# Patient Record
Sex: FEMALE | Race: WHITE
[De-identification: names, ages, dates, MRNs, and addresses within clinical notes are randomized per-mention and may not be internally consistent; named-entity substitution may affect disease eponyms.]

---

## 2021-05-18 ENCOUNTER — HOSPITAL ENCOUNTER (EMERGENCY)
Dept: HOSPITAL 63 - ER | Age: 56
Discharge: HOME | End: 2021-05-18
Payer: COMMERCIAL

## 2021-05-18 VITALS — WEIGHT: 149.91 LBS | HEIGHT: 71 IN | BODY MASS INDEX: 20.99 KG/M2

## 2021-05-18 VITALS — SYSTOLIC BLOOD PRESSURE: 149 MMHG

## 2021-05-18 DIAGNOSIS — Z88.5: ICD-10-CM
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
## 2021-05-18 DIAGNOSIS — Z85.71: ICD-10-CM

## 2021-05-18 DIAGNOSIS — R94.8: ICD-10-CM

## 2021-05-18 DIAGNOSIS — R10.11: Primary | ICD-10-CM

## 2021-05-18 DIAGNOSIS — Z91.040: ICD-10-CM

## 2021-05-18 DIAGNOSIS — F17.210: ICD-10-CM

## 2021-05-18 DIAGNOSIS — T50.Z95A: ICD-10-CM

## 2021-05-18 LAB
ALBUMIN SERPL-MCNC: 2.6 G/DL (ref 3.4–5)
ALBUMIN/GLOB SERPL: 0.5 {RATIO} (ref 1–1.7)
ALP SERPL-CCNC: 92 U/L (ref 46–116)
ALT SERPL-CCNC: 25 U/L (ref 14–59)
ANION GAP SERPL CALC-SCNC: 5 MMOL/L (ref 6–14)
AST SERPL-CCNC: 22 U/L (ref 15–37)
BASOPHILS # BLD AUTO: 0 X10^3/UL (ref 0–0.2)
BASOPHILS NFR BLD: 0 % (ref 0–3)
BILIRUB SERPL-MCNC: 0.4 MG/DL (ref 0.2–1)
BUN/CREAT SERPL: 25 (ref 6–20)
CA-I SERPL ISE-MCNC: 33 MG/DL (ref 7–20)
CALCIUM SERPL-MCNC: 9 MG/DL (ref 8.5–10.1)
CHLORIDE SERPL-SCNC: 103 MMOL/L (ref 98–107)
CO2 SERPL-SCNC: 29 MMOL/L (ref 21–32)
CREAT SERPL-MCNC: 1.3 MG/DL (ref 0.6–1)
EOSINOPHIL NFR BLD: 0.1 X10^3/UL (ref 0–0.7)
EOSINOPHIL NFR BLD: 1 % (ref 0–3)
ERYTHROCYTE [DISTWIDTH] IN BLOOD BY AUTOMATED COUNT: 14.1 % (ref 11.5–14.5)
GFR SERPLBLD BASED ON 1.73 SQ M-ARVRAT: 42.8 ML/MIN
GLOBULIN SER-MCNC: 4.9 G/DL (ref 2.2–3.8)
GLUCOSE SERPL-MCNC: 116 MG/DL (ref 70–99)
HCT VFR BLD CALC: 37.6 % (ref 36–47)
HGB BLD-MCNC: 12.6 G/DL (ref 12–15.5)
LIPASE: 83 U/L (ref 73–393)
LYMPHOCYTES # BLD: 0.8 X10^3/UL (ref 1–4.8)
LYMPHOCYTES NFR BLD AUTO: 9 % (ref 24–48)
MCH RBC QN AUTO: 33 PG (ref 25–35)
MCHC RBC AUTO-ENTMCNC: 33 G/DL (ref 31–37)
MCV RBC AUTO: 98 FL (ref 79–100)
MONO #: 1.2 X10^3/UL (ref 0–1.1)
MONOCYTES NFR BLD: 13 % (ref 0–9)
NEUT #: 7.1 X10^3UL (ref 1.8–7.7)
NEUTROPHILS NFR BLD AUTO: 77 % (ref 31–73)
PLATELET # BLD AUTO: 174 X10^3/UL (ref 140–400)
POTASSIUM SERPL-SCNC: 3.8 MMOL/L (ref 3.5–5.1)
PROT SERPL-MCNC: 7.5 G/DL (ref 6.4–8.2)
RBC # BLD AUTO: 3.83 X10^6/UL (ref 3.5–5.4)
SODIUM SERPL-SCNC: 137 MMOL/L (ref 136–145)
WBC # BLD AUTO: 9.1 X10^3/UL (ref 4–11)

## 2021-05-18 PROCEDURE — 85025 COMPLETE CBC W/AUTO DIFF WBC: CPT

## 2021-05-18 PROCEDURE — 74177 CT ABD & PELVIS W/CONTRAST: CPT

## 2021-05-18 PROCEDURE — 96361 HYDRATE IV INFUSION ADD-ON: CPT

## 2021-05-18 PROCEDURE — 83690 ASSAY OF LIPASE: CPT

## 2021-05-18 PROCEDURE — 36415 COLL VENOUS BLD VENIPUNCTURE: CPT

## 2021-05-18 PROCEDURE — 96374 THER/PROPH/DIAG INJ IV PUSH: CPT

## 2021-05-18 PROCEDURE — 80053 COMPREHEN METABOLIC PANEL: CPT

## 2021-05-18 PROCEDURE — 70491 CT SOFT TISSUE NECK W/DYE: CPT

## 2021-05-18 PROCEDURE — 71260 CT THORAX DX C+: CPT

## 2021-05-18 PROCEDURE — 99285 EMERGENCY DEPT VISIT HI MDM: CPT

## 2021-05-18 NOTE — RAD
EXAM: Neck, chest, abdomen and pelvis CT with intravenous contrast.



HISTORY: Neck pain.



TECHNIQUE: Computed tomographic images of the neck, chest, abdomen and pelvis were obtained following
 the administration of intravenous contrast. Multiplanar reformatting was performed.



*One or more of the following individualized dose reduction techniques were utilized for this examina
tion:  

1. Automated exposure control.  

2. Adjustment of the mA and/or kV according to patient size.  

3. Use of iterative reconstruction technique.



COMPARISON: None.



FINDINGS: 



Neck: There is a centrally necrotic mass within the left anterior lateral neck lateral to the common 
carotid artery measuring 4.0 cm in maximum dimension. There are a few nonspecific stranding cervical 
chain lymph nodes. The right submandibular gland is absent or nearly completely absent. There is calc
ified atherosclerotic plaque involving the left carotid bulb, bilateral internal carotid arteries and
 right external carotid artery. This results in less than 50 percent stenosis. No thyroid nodule is s
een. There is a right chest wall port catheter with the tip in the right atrium. There are metallic c
lips are fiducials within the left thoracic inlet. There is a left apical mass or masslike consolidat
ion with small amount of loculated pleural fluid measuring approximately 5.6 cm. This demonstrates sp
iculated margins and surrounding groundglass and internal cystic or bronchiectatic changes. There are
 degenerative changes involving the cervical spine. This is associated with mild left foraminal steno
sis at C3-C4 and mild right and moderate left foraminal stenosis at C5-C6.



Chest: There is left hemithorax volume loss. There is aortic and aortic branch vessel atherosclerosis
. The heart is normal in size. There are enlarged mediastinal lymph nodes. For reference purposes, th
ere are precarinal lymph nodes measuring up to 1.5 cm. There is no pneumothorax. There is a 5.6 cm ma
ss or masslike consolidation involving the anterior lateral right lower lobe. There is a similar-appe
aring mass or masslike consolidation measuring 5.6 cm within the medial right middle lobe. There are 
additional multiple scattered pulmonary nodules and groundglass opacities, several which are spiculat
ed. There is no suspicious osseous lesion.



Abdomen and pelvis: No hepatic lesion is seen. The gallbladder is surgically absent. No pancreatic le
dawson is seen. The spleen is normal in size. The adrenal glands are unremarkable. There is a small sim
ple appearing right renal cyst. Follow up is not routinely performed for simple cysts. There is no ap
pendicitis. There is no bowel obstruction. There is moderate colonic stool. There is distal colonic d
iverticulosis. The bladder, uterus and adnexal regions are unremarkable. There is aortic and aortic b
ranch vessel atherosclerosis. There is no lymphadenopathy. There is no suspicious osseous lesion. The
re is lumbar hyperlordosis and there is grade 1 anterolisthesis of L5 on S1.



IMPRESSION:

1. 4.0 cm centrally necrotic mass within the left neck swelling and mild deviation of the surrounding
 vascular structures. The imaging appearance favors a centrally necrotic pathologically enlarged lymp
h node or lymph node conglomerate. There are postoperative changes involving the right neck. Correlat
e with prior imaging and surgical history.

2. Left apical masslike consolidation with small loculated pleural fluid collection and internal cyst
ic or bronchiectatic changes. This is associated with left hemithorax volume loss. There are adjacent
 metallic clips are fiducials. This may be due to known residual primary neoplasm or post treatment c
[facility]. Correlate with surgical and oncologic history.

3. Multiple pulmonary masses and masslike opacities measuring up to 5.6 cm within the right lower and
 middle lobes. This includes both neoplastic and infectious etiologies. Once again, correlate with sullivan
rgical and oncologic history.

4. Mediastinal lymphadenopathy. This may be reactive or metastatic.

5. Colonic diverticulosis.



Electronically signed by: Sarai Castro MD (5/18/2021 2:51 PM) [id number]

## 2021-05-18 NOTE — PHYS DOC
Past History


Past Medical History:  Cancer


 (ISRAEL MENDOZA)


Alcohol Use:  None


 (ISRAEL MENDOZA)


Smoking:  Cigarettes


Alcohol Use:  None


Drug Use:  None


 (ISRAEL SNYDER DO)





Adult General


Chief Complaint


Chief Complaint:  SHORTNESS OF BREATH





HPI


HPI





Patient is a 55-year-old female presents to the emergency department complaining

of "I think I am having side effect reactions to my immunotherapy treatments 

"patient reports she was diagnosed with Hodgkin's lymphoma 9 years ago, then 

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  States she has 

had several rounds of chemo and radiation therapy, had stem cell therapy in 

.  Started immunotherapy just recently first session was , second 

session was 13 May.  Patient states since starting immunotherapy she feels off 

and on sensations as if her throat is going to close off and right upper 

quadrant abdominal pain that radiates to her left lower quadrant intermittently 

as well.  Patient states when she does have pain it will be between a 4 and a 

10/10 pain on a 1-10 pain scale.  Patient denies any pain at this time.  Patient

states she is not short of breath, however she feels her throat is closing off 

and makes her feel short of breath sometimes.  Patient states she becomes 

nauseated but has had no vomiting.  Patient denies any recent fever or chills.  

Patient reports an allergy to hydrocodone.  Patient states her only home 

medication is albuterol.  Patient states she is a cigarette smoker, denies EtOH 

or illicit drug use.  Patient reports she is a patient of Dr. De La Cruz from [facility] oncology specialty.  Patient currently denies any symptoms.  Patient 

states that her oncology specialist sent her to the emergency department for an 

evaluation for possible adverse reaction to her immunotherapy.


 (ISRAEL MENDOZA)





Review of Systems


Review of Systems





14 body systems of review of systems have been reviewed.  See HPI for pertinent 

positives and negative responses, otherwise all other systems are negative, 

nonpertinent or noncontributory.


 (ISRAEL MENDOZA)





Current Medications


Current Medications





Current Medications








 Medications


  (Trade)  Dose


 Ordered  Sig/John  Start Time


 Stop Time Status Last Admin


Dose Admin


 


 Info


  (Do NOT chart on


 this entry -- for


 MONITORING)  1 each  PRN DAILY  PRN  21 14:15


 21 14:14   





 


 Iohexol


  (Omnipaque 300


 Mg/ml)  75 ml  1X  ONCE  21 14:15


 21 14:16 DC 21 14:16


75 ML


 


 Lorazepam


  (Ativan Inj)  1 mg  1X  ONCE  21 14:00


 21 14:08 DC 21 14:00


1 MG


 


 Sodium Chloride  1,000 ml @ 


 1,000 mls/hr  1X  ONCE  21 14:00


 21 15:00 DC 21 14:30


1,000 MLS/HR








 (ISRAEL MENDOZA)





Allergies


Allergies





Allergies








Coded Allergies Type Severity Reaction Last Updated Verified


 


  latex Allergy Severe  21 Yes


 


  hydrocodone Allergy Intermediate  21 Yes








 (ISRAEL MENDOZA)





Physical Exam


Physical Exam





Constitutional: Well developed, well nourished, no acute distress, non-toxic 

appearance.  55-year-old female in no apparent distress.


HENT: Normocephalic, atraumatic, bilateral external ears normal, oropharynx 

moist, no oral exudates, nose normal.  Oropharynx moist, pink, no deep tissue 

infectious process appreciated, no laryngeal edema, no uvular edema, no 

lymphadenopathy of the head or neck appreciated, there is a mass on the left 

anterior neck between anterior cervical nodes and trachea.  Patient airway is 

patent.  There is no drooling, no trismus appreciated.


Eyes: PERRLA, EOMI, conjunctiva normal, no discharge.  


Neck: Normal range of motion, no tenderness, supple, no stridor.  Mass to left 

anterior neck.


Cardiovascular:Heart rate regular rhythm, no murmur, heart sounds S1-S2 to 

auscultation.


Lungs & Thorax:  Bilateral breath sounds clear to auscultation no adventitious 

lung sounds appreciated.


Abdomen: Bowel sounds normal, soft, no tenderness, no masses, no pulsatile 

masses.  No bruising or ecchymotic areas of the abdomen appreciated.


Skin: Warm, dry, no erythema, no rash.  


Back: No tenderness, no CVA tenderness.  


Extremities: No tenderness, no cyanosis, no clubbing, ROM intact, no edema.  


Neurologic: Alert and oriented X 3, normal motor function, normal sensory 

function, no focal deficits noted. 


Psychologic: Affect normal, judgement normal, mood normal. 


 (ISRAEL MENDOZA)





Current Patient Data


Vital Signs





                                   Vital Signs








  Date Time  Temp Pulse Resp B/P (MAP) Pulse Ox O2 Delivery O2 Flow Rate FiO2


 


21 13:37 97.4 102 15 149/ 93 Room Air  








Lab Results





Laboratory Tests








Test


 21


14:15


 


White Blood Count 9.1 x10^3/uL 


 


Red Blood Count 3.83 x10^6/uL 


 


Hemoglobin 12.6 g/dL 


 


Hematocrit 37.6 % 


 


Mean Corpuscular Volume 98 fL 


 


Mean Corpuscular Hemoglobin 33 pg 


 


Mean Corpuscular Hemoglobin


Concent 33 g/dL 





 


Red Cell Distribution Width 14.1 % 


 


Platelet Count 174 x10^3/uL 


 


Neutrophils (%) (Auto) 77 % 


 


Lymphocytes (%) (Auto) 9 % 


 


Monocytes (%) (Auto) 13 % 


 


Eosinophils (%) (Auto) 1 % 


 


Basophils (%) (Auto) 0 % 


 


Neutrophils # (Auto) 7.1 x10^3uL 


 


Lymphocytes # (Auto) 0.8 x10^3/uL 


 


Monocytes # (Auto) 1.2 x10^3/uL 


 


Eosinophils # (Auto) 0.1 x10^3/uL 


 


Basophils # (Auto) 0.0 x10^3/uL 


 


Sodium Level 137 mmol/L 


 


Potassium Level 3.8 mmol/L 


 


Chloride Level 103 mmol/L 


 


Carbon Dioxide Level 29 mmol/L 


 


Anion Gap 5 


 


Blood Urea Nitrogen 33 mg/dL 


 


Creatinine 1.3 mg/dL 


 


Estimated GFR


(Cockcroft-Gault) 42.8 





 


BUN/Creatinine Ratio 25 


 


Glucose Level 116 mg/dL 


 


Calcium Level 9.0 mg/dL 


 


Total Bilirubin 0.4 mg/dL 


 


Aspartate Amino Transf


(AST/SGOT) 22 U/L 





 


Alanine Aminotransferase


(ALT/SGPT) 25 U/L 





 


Alkaline Phosphatase 92 U/L 


 


Total Protein 7.5 g/dL 


 


Albumin 2.6 g/dL 


 


Albumin/Globulin Ratio 0.5 


 


Lipase 83 U/L 








Current Medications








 Medications


  (Trade)  Dose


 Ordered  Sig/John


 Route


 PRN Reason  Start Time


 Stop Time Status Last Admin


Dose Admin


 


 Sodium Chloride  1,000 ml @ 


 1,000 mls/hr  1X  ONCE


 IV


   21 14:00


 21 15:00 DC 21 14:30


1,000 MLS/HR


 


 Lorazepam


  (Ativan Inj)  1 mg  1X  ONCE


 IVP


   21 14:00


 21 14:08 DC 21 14:00


1 MG


 


 Iohexol


  (Omnipaque 300


 Mg/ml)  75 ml  1X  ONCE


 IV


   21 14:15


 21 14:16 DC 21 14:16


75 ML


 


 Info


  (Do NOT chart on


 this entry -- for


 MONITORING)  1 each  PRN DAILY  PRN


 MC


 SEE COMMENTS  21 14:15


 21 14:14   











                                Laboratory Tests








Test


 21


14:15


 


White Blood Count


 9.1 x10^3/uL


(4.0-11.0)


 


Red Blood Count


 3.83 x10^6/uL


(3.50-5.40)


 


Hemoglobin


 12.6 g/dL


(12.0-15.5)


 


Hematocrit


 37.6 %


(36.0-47.0)


 


Mean Corpuscular Volume


 98 fL ()





 


Mean Corpuscular Hemoglobin 33 pg (25-35)  


 


Mean Corpuscular Hemoglobin


Concent 33 g/dL


(31-37)


 


Red Cell Distribution Width


 14.1 %


(11.5-14.5)


 


Platelet Count


 174 x10^3/uL


(140-400)


 


Neutrophils (%) (Auto) 77 % (31-73)  H


 


Lymphocytes (%) (Auto) 9 % (24-48)  L


 


Monocytes (%) (Auto) 13 % (0-9)  H


 


Eosinophils (%) (Auto) 1 % (0-3)  


 


Basophils (%) (Auto) 0 % (0-3)  


 


Neutrophils # (Auto)


 7.1 x10^3uL


(1.8-7.7)


 


Lymphocytes # (Auto)


 0.8 x10^3/uL


(1.0-4.8)  L


 


Monocytes # (Auto)


 1.2 x10^3/uL


(0.0-1.1)  H


 


Eosinophils # (Auto)


 0.1 x10^3/uL


(0.0-0.7)


 


Basophils # (Auto)


 0.0 x10^3/uL


(0.0-0.2)


 


Sodium Level


 137 mmol/L


(136-145)


 


Potassium Level


 3.8 mmol/L


(3.5-5.1)


 


Chloride Level


 103 mmol/L


()


 


Carbon Dioxide Level


 29 mmol/L


(21-32)


 


Anion Gap 5 (6-14)  L


 


Blood Urea Nitrogen


 33 mg/dL


(7-20)  H


 


Creatinine


 1.3 mg/dL


(0.6-1.0)  H


 


Estimated GFR


(Cockcroft-Gault) 42.8  





 


BUN/Creatinine Ratio 25 (6-20)  H


 


Glucose Level


 116 mg/dL


(70-99)  H


 


Calcium Level


 9.0 mg/dL


(8.5-10.1)


 


Total Bilirubin


 0.4 mg/dL


(0.2-1.0)


 


Aspartate Amino Transferase


(AST) 22 U/L (15-37)





 


Alanine Aminotransferase (ALT)


 25 U/L (14-59)





 


Alkaline Phosphatase


 92 U/L


()


 


Total Protein


 7.5 g/dL


(6.4-8.2)


 


Albumin


 2.6 g/dL


(3.4-5.0)  L


 


Albumin/Globulin Ratio


 0.5 (1.0-1.7)


L


 


Lipase


 83 U/L


()








 (ISRAEL MENDOZA)





EKG


EKG


[]


 (ISRAEL MENDOZA)





Radiology/Procedures


Radiology/Procedures


PATIENT: VINH COVARRUBIASOUNT: [id number]     MRN#: [id number]


: 1965           LOCATION: ER              AGE: 55


SEX: F                    EXAM DT: 21         ACCESSION#: [id number]


STATUS: REG ER            ORD. PHYSICIAN: ISRAEL MENDOZA


REASON: ANTERIOR LOWER NECK DISCOMFORT, CHOKING SENSATION


PROCEDURE: CT NECK CHEST ABD PELVIS W CON








EXAM: Neck, chest, abdomen and pelvis CT with intravenous contrast.





HISTORY: Neck pain.





TECHNIQUE: Computed tomographic images of the neck, chest, abdomen and pelvis 

were obtained following the administration of intravenous contrast. Multiplanar 

reformatting was performed.





*One or more of the following individualized dose reduction techniques were 

utilized for this examination:  


1. Automated exposure control.  


2. Adjustment of the mA and/or kV according to patient size.  


3. Use of iterative reconstruction technique.





COMPARISON: None.





FINDINGS: 





Neck: There is a centrally necrotic mass within the left anterior lateral neck 

lateral to the common carotid artery measuring 4.0 cm in maximum dimension. 

There are a few nonspecific stranding cervical chain lymph nodes. The right 

submandibular gland is absent or nearly completely absent. There is calcified 

atherosclerotic plaque involving the left carotid bulb, bilateral internal 

carotid arteries and right external carotid artery. This results in less than 50

 percent stenosis. No thyroid nodule is seen. There is a right chest wall port 

catheter with the tip in the right atrium. There are metallic clips are 

fiducials within the left thoracic inlet. There is a left apical mass or 

masslike consolidation with small amount of loculated pleural fluid measuring 

approximately 5.6 cm. This demonstrates spiculated margins and surrounding 

groundglass and internal cystic or bronchiectatic changes. There are 

degenerative changes involving the cervical spine. This is associated with mild 

left foraminal stenosis at C3-C4 and mild right and moderate left foraminal 

stenosis at C5-C6.





Chest: There is left hemithorax volume loss. There is aortic and aortic branch 

vessel atherosclerosis. The heart is normal in size. There are enlarged 

mediastinal lymph nodes. For reference purposes, there are precarinal lymph 

nodes measuring up to 1.5 cm. There is no pneumothorax. There is a 5.6 cm mass 

or masslike consolidation involving the anterior lateral right lower lobe. There

 is a similar-appearing mass or masslike consolidation measuring 5.6 cm within 

the medial right middle lobe. There are additional multiple scattered pulmonary 

nodules and groundglass opacities, several which are spiculated. There is no 

suspicious osseous lesion.





Abdomen and pelvis: No hepatic lesion is seen. The gallbladder is surgically 

absent. No pancreatic lesion is seen. The spleen is normal in size. The adrenal 

glands are unremarkable. There is a small simple appearing right renal cyst. 

Follow up is not routinely performed for simple cysts. There is no appendicitis.

 There is no bowel obstruction. There is moderate colonic stool. There is distal

 colonic diverticulosis. The bladder, uterus and adnexal regions are 

unremarkable. There is aortic and aortic branch vessel atherosclerosis. There is

 no lymphadenopathy. There is no suspicious osseous lesion. There is lumbar 

hyperlordosis and there is grade 1 anterolisthesis of L5 on S1.





IMPRESSION:


1. 4.0 cm centrally necrotic mass within the left neck swelling and mild 

deviation of the surrounding vascular structures. The imaging appearance favors 

a centrally necrotic pathologically enlarged lymph node or lymph node 

conglomerate. There are postoperative changes involving the right neck. 

Correlate with prior imaging and surgical history.


2. Left apical masslike consolidation with small loculated pleural fluid 

collection and internal cystic or bronchiectatic changes. This is associated 

with left hemithorax volume loss. There are adjacent metallic clips are 

fiducials. This may be due to known residual primary neoplasm or post treatment 

changes. Correlate with surgical and oncologic history.


3. Multiple pulmonary masses and masslike opacities measuring up to 5.6 cm 

within the right lower and middle lobes. This includes both neoplastic and 

infectious etiologies. Once again, correlate with surgical and oncologic 

history.


4. Mediastinal lymphadenopathy. This may be reactive or metastatic.


5. Colonic diverticulosis.





Electronically signed by: Sarai Shah MD (2021 2:51 PM) [id number]














DICTATED AND SIGNED BY:     SARAI SHAH MD


DATE:     21 1438





CC: ISRAEL MENDOZA; TIERNEYXAVIER VIN ~MTH0 0


 (ISRAEL MENDOZA)





Heart Score


C/O Chest Pain:  No


Risk Factors:


Risk Factors:  DM, Current or recent (<one month) smoker, HTN, HLP, family 

history of CAD, obesity.


Risk Scores:


Risk Factors:  DM, Current or recent (<one month) smoker, HTN, HLP, family 

history of CAD, obesity.


 (ISRAEL MENDOZA)





Course & Med Decision Making


Course & Med Decision Making


Pertinent Labs and Imaging studies reviewed. (See chart for details)





55-year-old female presents to the emergency department concerning "I think I am

 having a reaction to my immunotherapy treatments ". patient's physical 

examination unremarkable for acute process, however per patient report, patient 

has long history of Hodgkin's lymphoma, mouth cancer with neck mets, is 

currently being treated by Dr. De La Cruz oncology specialist at [facility].  

Related to patient's symptoms will perform CT soft tissue neck abdomen and 

pelvis.  Will draw CBC, BMP, lipase.  The patient states she does feel anxious, 

1 mg IV Ativan ordered along with 1 L normal saline.





Patient is lab unremarkable, the patient is not neutropenic.  CT tech called to 

advise seeing a mass in patient's lungs, added CT chest with IV contrast.  

Patient had no previous CT imaging to compare, CT imaging concerning findings in

 throat, chest, and abdomen.  Reviewed CT imaging report with patient who states

 she believes the concerning findings in her chest and lung areas are new.  

Reevaluation of the patient, patient states she feels much better and is 

symptom-free.  Patient states the Ativan helped her tremendously.  I called and 

discussed patient case with DR. DE LA CRUZ'S nurse Emily RN who requested imaging be 

placed on the cloud for review, Emily MONDRAGON stated she would call patient later 

today or tomorrow for a sooner appointment.  Patient has an appointment 

scheduled in her oncology office scheduled for 2 weeks currently.





Called radiology department to have CT imaging placed on cloud for oncology 

review.





Patient gave verbal understanding of discharge home instructions, follow-up with

 oncology today or tomorrow, return to ER precautions and concerns, patient 

states she feels much better and is ready to go home, patient was discharged 

home without incident.


 (ISRAEL MENDOZA)





Dragon Disclaimer


Dragon Disclaimer


This electronic medical record was generated, in whole or in part, using a voice

 recognition dictation system.


 (ISRAEL MENDOZA)





Departure


Departure:


Impression:  


   Primary Impression:  


   Adverse drug reaction


   Additional Impression:  


   Abnormal CT scan


Disposition:  01 HOME / SELF CARE / HOMELESS


Condition:  GOOD


Referrals:  


XAVIER MONET (PCP)





Additional Instructions:  


You are seen today in the emergency department concerning adverse reaction to 

your immunotherapy treatments.  You were treated with 1 mg Ativan intravenously,

 this seemed to help you as you state you are now symptom-free.  A CT with 

contrast was performed of your neck chest abdomen and pelvis.  I reviewed these 

findings with both you and your oncologist nurse GIANCARLO Martinez who states that she 

will call you either later today or tomorrow for a sooner appointment.  Please 

keep this appointment with your oncologist.  Return to the emergency department 

for worsening symptoms or other concerns.





EMERGENCY DEPARTMENT GENERAL DISCHARGE INSTRUCTIONS





Thank you for coming to [facility] (ED) today and trusting us

 with you 


care.  We trust that you had a positivie experience in our Emergency Department.

  If you 


wish to speak to the department management, you may call the director at 

[phone].





YOUR FOLLOW UP INSTRUCTIONS ARE AS FOLLOWS:





1.  Do you have a private Doctor?  If you do not have a private doctor, please 

ask for a 


resource list of physicians or clinics that may be able to assist you with 

follow up care.





2.  The Emergency Physician has interpreted your x-rays.  The X-Ray specialist 

will also 


review them.  If there is a change in the findings, you will be notified in 48 

hours when at 


all possible.





3.  A lab test or culture has been done, your results will be reviewed and you 

will be 


notified if you need a change in treatment.





ADDITIONAL INSTRUCTIONS AND INFORMATION:





1.  Your care today has been supervised by a physician who is specially trained 

in emergency 


care.  Many problems require more than one evaluation for a complete diagnosis 

and 


treatment.  We recommend that you schedule your follow up appointment as 

recommended to 


ensure complete treatment of you illness or injury.  If you are unable to obtain

 follow up 


care and continue to have a problem, or if your condition worsens, we recommend 

that you 


return to the ED.





2.  We are not able to safely determine your condition over the phone nor are we

 able to 


give sound medical advice over the phone.  For these safety reasons, if you call

 for medical 


advice we will ask you to come to the ED for further evaluation.





3.  If you have any questions regarding these discharge instructions please call

 the ED at 


[phone].





SAFETY INFORMATION:





In the interest of safety, wellness, and injury prevention; we encourage you to 

wear your 


sealbelt, if you smoke; quite smoking, and we encourage family to use a 

protective helmet 


for bicycling and other sporting events that present an increased risk for head 

injury.





IF YOUR SYMPTOMS WORSEN OR NEW SYMPTOMS DEVELOP, OR YOU HAVE CONCERNS ABOUT YOUR

 CONDITION; 


OR IF YOUR CONDITION WORSENS WHILE YOU ARE WAITING FOR YOUR FOLLOW UP 

APPOINTMENT; EITHER 


CONTACT YOUR PRIMARY CARE DOCTOR, THE PHYSICIAN WHOSE NAME AND NUMBER YOU WERE 

GIVEN, OR 


RETURN TO THE ED IMMEDIATELY.





Attending Signature


Attending Signature


I have reviewed the PA/NP's note and plan of care. I was available for 

consultation as needed during the patient's visit in the emergency department. I

 agree with the clinical impression, plan, and disposition.


 (ISRAEL SNYDER DO)





Problem Qualifiers








   Primary Impression:  


   Adverse drug reaction


   Encounter type:  initial encounter  Qualified Codes:  T50.905A - Adverse 

   effect of unspecified drugs, medicaments and biological substances, initial 

   encounter








ISRAEL MENDOZA APRN       May 18, 2021 15:41


ISRAEL SNYDER DO             May 18, 2021 17:58

## 2021-09-16 ENCOUNTER — HOSPITAL ENCOUNTER (EMERGENCY)
Dept: HOSPITAL 63 - ER | Age: 56
Discharge: ANOTHER HOSPITAL | End: 2021-09-16
Payer: COMMERCIAL

## 2021-09-16 VITALS — SYSTOLIC BLOOD PRESSURE: 116 MMHG | DIASTOLIC BLOOD PRESSURE: 70 MMHG

## 2021-09-16 VITALS — BODY MASS INDEX: 20.06 KG/M2 | WEIGHT: 143.3 LBS | HEIGHT: 71 IN

## 2021-09-16 DIAGNOSIS — Z88.5: ICD-10-CM

## 2021-09-16 DIAGNOSIS — Z91.040: ICD-10-CM

## 2021-09-16 DIAGNOSIS — Z85.89: ICD-10-CM

## 2021-09-16 DIAGNOSIS — D61.818: ICD-10-CM

## 2021-09-16 DIAGNOSIS — J05.10: ICD-10-CM

## 2021-09-16 DIAGNOSIS — Z85.118: ICD-10-CM

## 2021-09-16 DIAGNOSIS — E87.6: ICD-10-CM

## 2021-09-16 DIAGNOSIS — Z90.49: ICD-10-CM

## 2021-09-16 DIAGNOSIS — F17.210: ICD-10-CM

## 2021-09-16 DIAGNOSIS — Z86.19: ICD-10-CM

## 2021-09-16 DIAGNOSIS — J38.4: Primary | ICD-10-CM

## 2021-09-16 LAB
% BANDS: 2 % (ref 0–9)
% LYMPHS: 14 % (ref 24–48)
% MONOS: 7 % (ref 0–10)
% SEGS: 76 % (ref 35–66)
ANION GAP SERPL CALC-SCNC: 3 MMOL/L (ref 6–14)
BASOPHILS # BLD AUTO: 0 X10^3/UL (ref 0–0.2)
BASOPHILS NFR BLD: 1 % (ref 0–3)
CA-I SERPL ISE-MCNC: 27 MG/DL (ref 7–20)
CALCIUM SERPL-MCNC: 8.6 MG/DL (ref 8.5–10.1)
CHLORIDE SERPL-SCNC: 103 MMOL/L (ref 98–107)
CO2 SERPL-SCNC: 31 MMOL/L (ref 21–32)
CREAT SERPL-MCNC: 0.8 MG/DL (ref 0.6–1)
EOSINOPHIL NFR BLD AUTO: 1 % (ref 0–5)
EOSINOPHIL NFR BLD: 0 X10^3/UL (ref 0–0.7)
EOSINOPHIL NFR BLD: 2 % (ref 0–3)
ERYTHROCYTE [DISTWIDTH] IN BLOOD BY AUTOMATED COUNT: 13.9 % (ref 11.5–14.5)
GFR SERPLBLD BASED ON 1.73 SQ M-ARVRAT: 75 ML/MIN
GLUCOSE SERPL-MCNC: 141 MG/DL (ref 70–99)
HCT VFR BLD CALC: 33 % (ref 36–47)
HGB BLD-MCNC: 10.8 G/DL (ref 12–15.5)
LYMPHOCYTES # BLD: 0.3 X10^3/UL (ref 1–4.8)
LYMPHOCYTES NFR BLD AUTO: 11 % (ref 24–48)
MCH RBC QN AUTO: 32 PG (ref 25–35)
MCHC RBC AUTO-ENTMCNC: 33 G/DL (ref 31–37)
MCV RBC AUTO: 97 FL (ref 79–100)
MONO #: 0.2 X10^3/UL (ref 0–1.1)
MONOCYTES NFR BLD: 8 % (ref 0–9)
NEUT #: 1.9 X10^3UL (ref 1.8–7.7)
NEUTROPHILS NFR BLD AUTO: 78 % (ref 31–73)
PLATELET # BLD AUTO: 41 X10^3/UL (ref 140–400)
PLATELET # BLD EST: (no result) 10*3/UL
POTASSIUM SERPL-SCNC: 3.3 MMOL/L (ref 3.5–5.1)
RBC # BLD AUTO: 3.38 X10^6/UL (ref 3.5–5.4)
SODIUM SERPL-SCNC: 137 MMOL/L (ref 136–145)
WBC # BLD AUTO: 2.5 X10^3/UL (ref 4–11)

## 2021-09-16 PROCEDURE — 96374 THER/PROPH/DIAG INJ IV PUSH: CPT

## 2021-09-16 PROCEDURE — 96375 TX/PRO/DX INJ NEW DRUG ADDON: CPT

## 2021-09-16 PROCEDURE — 85007 BL SMEAR W/DIFF WBC COUNT: CPT

## 2021-09-16 PROCEDURE — 36415 COLL VENOUS BLD VENIPUNCTURE: CPT

## 2021-09-16 PROCEDURE — 80048 BASIC METABOLIC PNL TOTAL CA: CPT

## 2021-09-16 PROCEDURE — U0003 INFECTIOUS AGENT DETECTION BY NUCLEIC ACID (DNA OR RNA); SEVERE ACUTE RESPIRATORY SYNDROME CORONAVIRUS 2 (SARS-COV-2) (CORONAVIRUS DISEASE [COVID-19]), AMPLIFIED PROBE TECHNIQUE, MAKING USE OF HIGH THROUGHPUT TECHNOLOGIES AS DESCRIBED BY CMS-2020-01-R: HCPCS

## 2021-09-16 PROCEDURE — 96376 TX/PRO/DX INJ SAME DRUG ADON: CPT

## 2021-09-16 PROCEDURE — 96361 HYDRATE IV INFUSION ADD-ON: CPT

## 2021-09-16 PROCEDURE — 99285 EMERGENCY DEPT VISIT HI MDM: CPT

## 2021-09-16 PROCEDURE — 70491 CT SOFT TISSUE NECK W/DYE: CPT

## 2021-09-16 PROCEDURE — 87426 SARSCOV CORONAVIRUS AG IA: CPT

## 2021-09-16 PROCEDURE — 85025 COMPLETE CBC W/AUTO DIFF WBC: CPT

## 2021-09-16 PROCEDURE — C9803 HOPD COVID-19 SPEC COLLECT: HCPCS

## 2021-09-16 NOTE — RAD
Clinical indications:History of head and neck cancer. Now with inspiratory stridor and loss of voice.




COMPARISON: May 18, 2021.



Technique: After IV infusion of 75 cc of Omnipaque 300, ml of Isovue 370, helical CT scanning of the 
soft tissues of the neck was performed from the base of the skull down through the lung apices. Axial
 and coronal reconstructions were generated and reviewed on a computer monitor.



PQRS compliance Statement



One or more of the following individualized dose reduction techniques were utilized for this study:

1.  Automated exposure control

2.  Adjustment of the mA and/or kV according to patient size

3.  Use of iterative reconstruction technique





Findings: Again seen is a large necrotic or cystic left lower neck mass. It appears less liquefied or
 cystic today. The greatest AP dimension is 3.8 cm and the greatest transverse dimension is 3.5 cm. I
t is slightly bigger in size in comparison to the previous study with AP and transverse dimensions of
 3.6 cm and 3.3 cm respectively. This could be secondary to treatment in the interim. There are addit
ional lymph nodes seen within the left side of the neck deep to the sternocleidomastoid muscle. There
 is one seen adjacent to the left IJ measuring 10 mm in size seen on series 2 and image 35. There is 
another one seen more superiorly and posterior to the left IJ measuring 6 mm in size. There is anothe
r one seen more inferiorly anterior to the IJ measuring 6 mm. This latter lymph node is stable. The o
ther 2 lymph nodes appear new. The left IJ is narrowed and compressed by the lower left neck mass. No
 thyroid gland mass is seen. There is new finding of swelling of the epiglottis and aryepiglottic fol
ds and new bilateral laryngeal edema with loss of the piriform sinuses on both sides. The parotid and
 left submandibular salivary glands are unremarkable. The right submandibular salivary gland appears 
surgically absent. Left apical lung infiltrate is again evident. There are new nodular lung infiltrat
es seen within both upper lobes when compared to the prior chest CT of May 18, 2021. No lytic process
 is seen.



Impression: New laryngeal edema and epiglottitis.



Mild increase in size of cystic necrotic mass in the left lower neck. It is less liquefied. This may 
be secondary to treatment in the interim. There are new small left cervical lymph nodes more superior
ly. There is narrowing and compression of the left internal jugular vein from the mass.



No change in left apical lung infiltrate. New groundglass nodular lung infiltrates within the upper l
obes bilaterally. This could be secondary to metastatic disease or infectious/inflammatory disease or
 even Covid 19 pneumonia if there is a history of such..





**********FOR INTERNAL CODING PURPOSES**********



Critical result:



Finding of laryngeal edema and epiglottitis discussed with  the emergency room physician Dr. Flakito alicea at 9/16/2021 1:26 PM.



RESULT CODE: (C)  



  







Electronically signed by: Juliano Guevara MD (9/16/2021 1:40 PM) [id number]

## 2021-09-16 NOTE — PHYS DOC
Past History


Past Medical History:  Cancer


Additional Past Medical Histor:  hep C, Head and neck cancer w/ mets to right 

lung


Past Surgical History:  Cholecystectomy


Smoking:  Cigarettes


Alcohol Use:  None


Drug Use:  None





General Adult


EDM:


Chief Complaint:  Neck Pain





HPI:


HPI:





Patient is a 55 year old female with medical history of head and neck cancer who

presents with concerns of voice change and neck pain.  Patient has had a tongue 

resection approximately a year ago and had been on radiation treatment until a 

month ago.  She attempted immunotherapy, but has been taken off this.  She is 

currently been on a 3 drug chemotherapy regimen.  Last dose of chemo was on 

Friday of last week.  She states that her chemo regimen is causing her to have 

blisters and sores all over her body.  Over the past couple of days she has had 

progressive difficulty swallowing.  Now unable to keep down fluids.  She also 

noted a voice change on Saturday following her chemotherapy.  She feels like it 

is more difficult to breathe and she has to sit upright to maintain ease of 

breathing.  She is concerned that she may have had "low-grade fevers", but has 

not checked her temperature.





Follows with an oncologist, Dr. Stroud, at [facility].





Review of Systems:


Review of Systems:


Constitutional:  Denies fever or chills 


Eyes:  Denies change in visual acuity 


HENT:  Denies nasal congestion or sore throat 


Respiratory:  Denies cough or shortness of breath 


Cardiovascular:  Denies chest pain or edema 


GI:  Denies abdominal pain, nausea, vomiting, bloody stools or diarrhea 


: Denies dysuria 


Musculoskeletal:  Denies back pain or joint pain 


Integument:  Denies rash 


Neurologic:  Denies headache, focal weakness or sensory changes 


Endocrine:  Denies polyuria or polydipsia 


Lymphatic:  Denies swollen glands 


Psychiatric:  Denies depression or anxiety





Allergies:


Allergies:





Allergies








Coded Allergies Type Severity Reaction Last Updated Verified


 


  latex Allergy Severe  21 Yes


 


  hydrocodone Allergy Intermediate  21 Yes











Physical Exam:


PE:





Constitutional: Well developed, well nourished, no acute distress, non-toxic 

appearance. []


HENT: Hyperpigmentation of the skin on the anterior neck.  Tenderness 

bilaterally in the cervical area with a mass that may be lymph nodes versus 

separate mass.  She has mild inspiratory stridor. []


Cardiovascular:Heart rate regular rhythm, no murmur []


Lungs & Thorax:  Bilateral breath sounds clear to auscultation []


Abdomen: Bowel sounds normal, soft, no tenderness, no masses, no pulsatile 

masses. [] 


Skin: Warm, dry, no erythema, no rash. [] 


Back: No tenderness, no CVA tenderness. [] 


Extremities: No tenderness, no cyanosis, no clubbing, ROM intact, no edema. [] 


Neurologic: Alert and oriented X 3, normal motor function, normal sensory 

function, no focal deficits noted. []


Psychologic: Affect normal, judgement normal, mood normal. []





Current Patient Data:


Vital Signs:





                                   Vital Signs








  Date Time  Temp Pulse Resp B/P (MAP) Pulse Ox O2 Delivery O2 Flow Rate FiO2


 


21 11:14  91 20 113/78 (90 92 Room Air  











EKG:


EKG:


[]





Radiology/Procedures:


Radiology/Procedures:


[]


Impressions:


                               [facility] [address], [place] [address]


                                 [phone]


                                        


                                 IMAGING REPORT





                                     Signed





PATIENT: VINH COVARRUBIASCCOUNT: [id number]     MRN#: [id number]


: 1965           LOCATION: ER              AGE: 55


SEX: F                    EXAM DT: 21         ACCESSION#: [id number]


STATUS: REG ER            ORD. PHYSICIAN: TANYA SOOD MD


REASON: head and neck cancer, now with inspiratory stridor, loss of voice


PROCEDURE: CT SOFT TISSUE NECK W/CONTRAST








Clinical indications:History of head and neck cancer. Now with inspiratory 

stridor and loss of voice.





COMPARISON: May 18, 2021.





Technique: After IV infusion of 75 cc of Omnipaque 300, ml of Isovue 370, 

helical CT scanning of the soft tissues of the neck was performed from the base 

of the skull down through the lung apices. Axial and coronal reconstructions 

were generated and reviewed on a computer monitor.





PQRS compliance Statement





One or more of the following individualized dose reduction techniques were 

utilized for this study:


1.  Automated exposure control


2.  Adjustment of the mA and/or kV according to patient size


3.  Use of iterative reconstruction technique








Findings: Again seen is a large necrotic or cystic left lower neck mass. It 

appears less liquefied or cystic today. The greatest AP dimension is 3.8 cm and 

the greatest transverse dimension is 3.5 cm. It is slightly bigger in size in 

comparison to the previous study with AP and transverse dimensions of 3.6 cm and

 3.3 cm respectively. This could be secondary to treatment in the interim. There

 are additional lymph nodes seen within the left side of the neck deep to the 

sternocleidomastoid muscle. There is one seen adjacent to the left IJ measuring 

10 mm in size seen on series 2 and image 35. There is another one seen more 

superiorly and posterior to the left IJ measuring 6 mm in size. There is another

 one seen more inferiorly anterior to the IJ measuring 6 mm. This latter lymph 

node is stable. The other 2 lymph nodes appear new. The left IJ is narrowed and 

compressed by the lower left neck mass. No thyroid gland mass is seen. There is 

new finding of swelling of the epiglottis and aryepiglottic folds and new 

bilateral laryngeal edema with loss of the piriform sinuses on both sides. The 

parotid and left submandibular salivary glands are unremarkable. The right 

submandibular salivary gland appears surgically absent. Left apical lung 

infiltrate is again evident. There are new nodular lung infiltrates seen within 

both upper lobes when compared to the prior chest CT of May 18, 2021. No lytic 

process is seen.





Impression: New laryngeal edema and epiglottitis.





Mild increase in size of cystic necrotic mass in the left lower neck. It is less

 liquefied. This may be secondary to treatment in the interim. There are new 

small left cervical lymph nodes more superiorly. There is narrowing and 

compression of the left internal jugular vein from the mass.





No change in left apical lung infiltrate. New groundglass nodular lung 

infiltrates within the upper lobes bilaterally. This could be secondary to 

metastatic disease or infectious/inflammatory disease or even Covid 19 pneumonia

 if there is a history of such..








**********FOR INTERNAL CODING PURPOSES**********





Critical result:





Finding of laryngeal edema and epiglottitis discussed with  the emergency room 

physician Dr. Tanya Sood at 2021 1:26 PM.





RESULT CODE: (C)  





  











Electronically signed by: [place] Guevara MD (2021 1:40 PM) [id number]














DICTATED AND SIGNED BY:     BOSTON GUEVARA MD


DATE:     21 1319





CC: TANYA SOOD MD; XAVIER MONET ~MTH0 0





Heart Score:


C/O Chest Pain:  No


Risk Factors:


Risk Factors:  DM, Current or recent (<one month) smoker, HTN, HLP, family 

history of CAD, obesity.


Risk Scores:


Score 0 - 3:  2.5% MACE over next 6 weeks - Discharge Home


Score 4 - 6:  20.3% MACE over next 6 weeks - Admit for Clinical Observation


Score 7 - 10:  72.7% MACE over next 6 weeks - Early Invasive Strategies





Course & Med Decision Making:


Course & Med Decision Making


Pertinent Labs and Imaging studies reviewed. (See chart for details)





Patient 55-year-old female with advanced head and neck cancer s/p XRT and 

resection, currently on chemotherapy who presents with difficulty breathing, 

inability to swallow, and worsening neck pain over the past 6 days since her 

last chemotherapy treatment.


On arrival is hemodynamically stable.  Satting 92% on room air.  She does have 

some mild inspiratory stridor, but is stable in an upright position.


We will give dexamethasone 10 mg to help reduce edema and IV fluids for 

rehydration.


We will obtain CT neck/soft tissues and basic labs.


1139





Labs show pancytopenia but no neutropenia.


CT neck shows evidence of laryngeal and epiglottitis edema.  Likely as result of

 XRT and/or chemotherapy at this time.  Less likely infectious.


She is stable on reassessment.


Feel that she will require admission to facility for dehydration and airway 

monitoring.


Given her head and neck cancer feel that the airway monitoring should take place

 in a facility that has ENT surgical capabilities.


Her oncologist is through [facility], will discuss with their transfer 

center. Awaiting call back. 


1456





Still awaiting call back from [facility]. 


She has remained stable thus far. Will start maintenance IVF and repeat 

dexamethasone 4mg. 


1710





[facility] does not have beds available with airway monitoring capability. 


Attempting [facility] in [place] next. 


1733





[facility] does not have available beds.


[facility] did accept the patient for ED to ED transfer.  Dr. Justin who is the accepting doctor.


They asked for an additional 4 mg of dexamethasone and a rapid Covid swab to be 

sent.  These orders were placed.


Arranging for transport.


180





Dragon Disclaimer:


Dragon Disclaimer:


This electronic medical record was generated, in whole or in part, using a voice

 recognition dictation system.





Departure


Departure:


Impression:  


   Primary Impression:  


   Laryngeal edema


   Additional Impressions:  


   Head and neck cancer


   Pancytopenia


   Hypokalemia


Disposition:  [facility]


Condition:  GUARDED


Referrals:  


XAVIER MONET (PCP)











TANYA SOOD MD              Sep 16, 2021 11:39